# Patient Record
Sex: MALE | Race: WHITE | NOT HISPANIC OR LATINO | Employment: STUDENT | URBAN - METROPOLITAN AREA
[De-identification: names, ages, dates, MRNs, and addresses within clinical notes are randomized per-mention and may not be internally consistent; named-entity substitution may affect disease eponyms.]

---
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
## 2024-10-31 ENCOUNTER — HOSPITAL ENCOUNTER (EMERGENCY)
Facility: HOSPITAL | Age: 24
Discharge: HOME | End: 2024-10-31
Attending: EMERGENCY MEDICINE
Payer: COMMERCIAL

## 2024-10-31 VITALS
RESPIRATION RATE: 20 BRPM | OXYGEN SATURATION: 99 % | SYSTOLIC BLOOD PRESSURE: 135 MMHG | HEART RATE: 96 BPM | DIASTOLIC BLOOD PRESSURE: 78 MMHG | TEMPERATURE: 98 F

## 2024-10-31 DIAGNOSIS — J02.9 ACUTE SORE THROAT: Primary | ICD-10-CM

## 2024-10-31 DIAGNOSIS — R11.2 NAUSEA & VOMITING: ICD-10-CM

## 2024-10-31 LAB — S PYO DNA THROAT QL NAA+PROBE: NOT DETECTED

## 2024-10-31 PROCEDURE — 87651 STREP A DNA AMP PROBE: CPT | Performed by: EMERGENCY MEDICINE

## 2024-10-31 PROCEDURE — 99284 EMERGENCY DEPT VISIT MOD MDM: CPT | Performed by: EMERGENCY MEDICINE

## 2024-10-31 PROCEDURE — 99283 EMERGENCY DEPT VISIT LOW MDM: CPT

## 2024-10-31 RX ORDER — IBUPROFEN 600 MG/1
600 TABLET, FILM COATED ORAL ONCE
Status: COMPLETED | OUTPATIENT
Start: 2024-10-31 | End: 2024-10-31

## 2024-10-31 RX ORDER — ONDANSETRON 4 MG/1
4 TABLET, ORALLY DISINTEGRATING ORAL EVERY 6 HOURS PRN
Qty: 12 TABLET | Refills: 0 | Status: SHIPPED | OUTPATIENT
Start: 2024-10-31 | End: 2024-10-31

## 2024-10-31 RX ORDER — ACETAMINOPHEN 325 MG/1
975 TABLET ORAL ONCE
Status: COMPLETED | OUTPATIENT
Start: 2024-10-31 | End: 2024-10-31

## 2024-10-31 RX ORDER — ONDANSETRON 4 MG/1
4 TABLET, ORALLY DISINTEGRATING ORAL EVERY 6 HOURS PRN
Qty: 12 TABLET | Refills: 0 | Status: SHIPPED | OUTPATIENT
Start: 2024-10-31

## 2024-10-31 RX ADMIN — ACETAMINOPHEN 975 MG: 325 TABLET ORAL at 13:49

## 2024-10-31 RX ADMIN — IBUPROFEN 600 MG: 600 TABLET, FILM COATED ORAL at 13:49

## 2024-10-31 NOTE — Clinical Note
Manuel Gonzales was seen and treated in our emergency department on 10/31/2024.                Diagnosis: Nausea vomiting    Manuel  may return to work on return date.    He may return on this date: 11/01/2024    Symptoms beginning Monday     If you have any questions or concerns, please don't hesitate to call.      Kale Worthy MD    ______________________________           _______________          _______________  Hospital Representative                              Date                                Time

## 2024-10-31 NOTE — ED PROVIDER NOTES
Time reflects when diagnosis was documented in both MDM as applicable and the Disposition within this note       Time User Action Codes Description Comment    10/31/2024  2:08 PM Kale Worthy Add [J02.9] Acute sore throat     10/31/2024  2:08 PM Kale Worthy Add [R11.2] Nausea & vomiting           ED Disposition       ED Disposition   Discharge    Condition   Stable    Date/Time   Thu Oct 31, 2024  2:08 PM    Comment   Manuel Gonzales discharge to home/self care.                   Assessment & Plan       Medical Decision Making  24-year-old male no significant reported prior past history presenting with nausea vomiting sore throat.  Self-limited nausea vomiting abdominal cramping after eating sushi.  Likely food related.  Sore throat began after vomiting likely related to vomiting.  Patient concerned for strep.  Will perform strep test.  Symptom management with oral medications.  Reassess.    Strep test negative.  Abdominal pain nausea vomiting sore throat precautions.  Work note. Discussed results and recommendations. Advised follow up PCP. Medication recommendations. Given instructions and return precautions. Patient/family at bedside acknowledged understanding of all written and verbal instructions and return precautions. Discharged.     Amount and/or Complexity of Data Reviewed  Labs: ordered.    Risk  OTC drugs.  Prescription drug management.             Medications   acetaminophen (TYLENOL) tablet 975 mg (975 mg Oral Given 10/31/24 1349)   ibuprofen (MOTRIN) tablet 600 mg (600 mg Oral Given 10/31/24 1349)       ED Risk Strat Scores                           SBIRT 20yo+      Flowsheet Row Most Recent Value   Initial Alcohol Screen: US AUDIT-C     1. How often do you have a drink containing alcohol? 0 Filed at: 10/31/2024 1226   2. How many drinks containing alcohol do you have on a typical day you are drinking?  0 Filed at: 10/31/2024 1226   3a. Male UNDER 65: How often do you have five or more drinks on one  occasion? 0 Filed at: 10/31/2024 1226   3b. FEMALE Any Age, or MALE 65+: How often do you have 4 or more drinks on one occassion? 0 Filed at: 10/31/2024 1226   Audit-C Score 0 Filed at: 10/31/2024 1226   LEVI: How many times in the past year have you...    Used an illegal drug or used a prescription medication for non-medical reasons? Never Filed at: 10/31/2024 1226                            History of Present Illness       Chief Complaint   Patient presents with    Flu Symptoms     Pt presents with c/o vomiting, cough, sore throat since Monday. Denies fevers. Recent travel to [place]        Past Medical History:   Diagnosis Date    Acne vulgaris       Past Surgical History:   Procedure Laterality Date    ABDOMINAL WALL SURGERY Bilateral 3/5/2019    Procedure: INTERMEDIATE CLOSURE WOUND NECK 2 CM X2;  Surgeon: Christo Swenson MD;  Location: [facility];  Service: General    CIRCUMCISION      FACIAL/NECK BIOPSY Bilateral 3/5/2019    Procedure: POSTERIOR NECK LESION/MASS X 2 EXCISION BIOPSY;  Surgeon: Christo Swenson MD;  Location: [facility];  Service: General    SINUS SURGERY      Closure of sacral sinus at age 3 year.  MRI was normal.  Surger done at [facility]      Family History   Problem Relation Age of Onset    No Known Problems Mother         living and healthy    No Known Problems Father         living and healthy    No Known Problems Paternal Grandmother     Diabetes Paternal Grandfather         DM    Cirrhosis Paternal Grandfather     Learning disabilities Family     No Known Problems Maternal Grandmother     No Known Problems Maternal Grandfather       Social History     Tobacco Use    Smoking status: Never    Smokeless tobacco: Never    Tobacco comments:     no tobacco/smoke exposure   Substance Use Topics    Alcohol use: No     Comment: Denies any use    Drug use: No     Comment: Denies any use      E-Cigarette/Vaping      E-Cigarette/Vaping Substances      I have reviewed and agree with the  history as documented.     24-year-old male no significant reported prior past history presenting with nausea vomiting sore throat.  Patient reports he had some sushi on Sunday and felt ill afterwards.  Patient reports that he began vomiting and had a few episodes of emesis as well as some clamminess and cold sweats associated with the abdominal cramping and vomiting.  Patient reports that his symptoms have resolved as of yesterday except for sore throat.  Denies any abdominal pain or current nausea vomiting.  Denies any chest pain shortness of breath.  Denies any other complaints.  Chart reviewed.    Past Medical History:  No date: Acne vulgaris  Family History: non-contributory  Social History          Review of Systems   Constitutional:  Negative for appetite change, chills, diaphoresis, fever and unexpected weight change.   HENT:  Positive for sore throat. Negative for congestion and rhinorrhea.    Eyes:  Negative for photophobia and visual disturbance.   Respiratory:  Negative for cough, chest tightness and shortness of breath.    Cardiovascular:  Negative for chest pain, palpitations and leg swelling.   Gastrointestinal:  Positive for nausea and vomiting. Negative for abdominal distention, abdominal pain, blood in stool, constipation and diarrhea.   Genitourinary:  Negative for dysuria and hematuria.   Musculoskeletal:  Negative for back pain, joint swelling, neck pain and neck stiffness.   Skin:  Negative for color change, pallor, rash and wound.   Neurological:  Negative for dizziness, syncope, weakness, light-headedness and headaches.   Psychiatric/Behavioral:  Negative for agitation.    All other systems reviewed and are negative.          Objective       ED Triage Vitals   Temperature Pulse Blood Pressure Respirations SpO2 Patient Position - Orthostatic VS   10/31/24 1226 10/31/24 1226 10/31/24 1227 10/31/24 1226 10/31/24 1226 10/31/24 1226   98 °F (36.7 °C) 101 135/78 20 99 % Sitting      Temp Source  Heart Rate Source BP Location FiO2 (%) Pain Score    10/31/24 1226 10/31/24 1226 10/31/24 1226 -- --    Temporal Monitor Left arm        Vitals      Date and Time Temp Pulse SpO2 Resp BP Pain Score FACES Pain Rating User   10/31/24 1355 -- 96 -- -- -- -- -- BK   10/31/24 1227 -- -- -- -- 135/78 -- -- FT   10/31/24 1226 98 °F (36.7 °C) 101 99 % 20 -- -- -- FT            Physical Exam  Vitals and nursing note reviewed.   Constitutional:       General: He is not in acute distress.     Appearance: Normal appearance. He is well-developed. He is not ill-appearing, toxic-appearing or diaphoretic.   HENT:      Head: Normocephalic and atraumatic.      Nose: Nose normal. No congestion or rhinorrhea.      Mouth/Throat:      Mouth: Mucous membranes are moist.      Pharynx: Oropharynx is clear. Posterior oropharyngeal erythema present. No oropharyngeal exudate.   Eyes:      General: No scleral icterus.        Right eye: No discharge.         Left eye: No discharge.      Extraocular Movements: Extraocular movements intact.      Conjunctiva/sclera: Conjunctivae normal.      Pupils: Pupils are equal, round, and reactive to light.   Neck:      Vascular: No JVD.      Trachea: No tracheal deviation.      Comments: Supple. Normal range of motion.   Cardiovascular:      Rate and Rhythm: Normal rate and regular rhythm.      Heart sounds: Normal heart sounds. No murmur heard.     No friction rub. No gallop.      Comments: Normal rate and regular rhythm  Pulmonary:      Effort: Pulmonary effort is normal. No respiratory distress.      Breath sounds: Normal breath sounds. No stridor. No wheezing or rales.      Comments: Clear to auscultation bilaterally  Chest:      Chest wall: No tenderness.   Abdominal:      General: Bowel sounds are normal. There is no distension.      Palpations: Abdomen is soft.      Tenderness: There is no abdominal tenderness. There is no right CVA tenderness, left CVA tenderness, guarding or rebound.      Comments:  Soft, nontender, nondistended.  Normal bowel sounds throughout   Musculoskeletal:         General: No swelling, tenderness, deformity or signs of injury. Normal range of motion.      Cervical back: Normal range of motion and neck supple. No rigidity. No muscular tenderness.      Right lower leg: No edema.      Left lower leg: No edema.   Lymphadenopathy:      Cervical: No cervical adenopathy.   Skin:     General: Skin is warm and dry.      Coloration: Skin is not pale.      Findings: No erythema or rash.   Neurological:      General: No focal deficit present.      Mental Status: He is alert. Mental status is at baseline.      Sensory: No sensory deficit.      Motor: No weakness or abnormal muscle tone.      Coordination: Coordination normal.      Gait: Gait normal.      Comments: Alert.  Strength and sensation grossly intact.  Ambulatory without difficulty at baseline.    Psychiatric:         Behavior: Behavior normal.         Thought Content: Thought content normal.         Results Reviewed       Procedure Component Value Units Date/Time    Strep A PCR [[id number]]  (Normal) Collected: 10/31/24 1352    Lab Status: Final result Specimen: Throat Updated: 10/31/24 1430     STREP A PCR Not Detected            No orders to display       Procedures    ED Medication and Procedure Management   None     Current Discharge Medication List        START taking these medications    Details   ondansetron (ZOFRAN-ODT) 4 mg disintegrating tablet Take 1 tablet (4 mg total) by mouth every 6 (six) hours as needed for nausea or vomiting  Qty: 12 tablet, Refills: 0    Associated Diagnoses: Nausea & vomiting           No discharge procedures on file.  ED SEPSIS DOCUMENTATION   Time reflects when diagnosis was documented in both MDM as applicable and the Disposition within this note       Time User Action Codes Description Comment    10/31/2024  2:08 PM Kale Worthy [J02.9] Acute sore throat     10/31/2024  2:08 PM Kale Worhty Add  [R11.2] Nausea & vomiting                  Kale Worthy MD  10/31/24 6725

## 2024-11-01 ENCOUNTER — TELEPHONE (OUTPATIENT)
Dept: PEDIATRICS CLINIC | Facility: CLINIC | Age: 24
End: 2024-11-01

## 2024-11-01 NOTE — TELEPHONE ENCOUNTER
Patient has aged out of practice and has not been seen in office since 2020. Please remove Dr. Ma from pcp field.

## 2024-11-11 NOTE — TELEPHONE ENCOUNTER
11/11/24 10:36 AM        The office's request has been received, reviewed, and the patient chart updated. The PCP has successfully been removed with a patient attribution note. This message will now be completed.        Thank you  Elvis Manzanares

## (undated) DEVICE — CHLORAPREP HI-LITE 10.5ML ORANGE

## (undated) DEVICE — BETHLEHEM UNIVERSAL MINOR GEN: Brand: CARDINAL HEALTH

## (undated) DEVICE — GLOVE SRG BIOGEL ECLIPSE 7

## (undated) DEVICE — NEEDLE 25G X 1 1/2

## (undated) DEVICE — CHLORAPREP HI-LITE 26ML ORANGE

## (undated) DEVICE — SUT MONOCRYL 4-0 PS-2 18 IN Y496G

## (undated) DEVICE — ADHESIVE SKN CLSR HISTOACRYL FLEX 0.5ML LF

## (undated) DEVICE — REM POLYHESIVE ADULT PATIENT RETURN ELECTRODE: Brand: VALLEYLAB

## (undated) DEVICE — MEDI-VAC YANKAUER SUCTION HANDLE W/STRAIGHT TIP & CONTROL VENT: Brand: CARDINAL HEALTH

## (undated) DEVICE — LIGHT HANDLE COVER SLEEVE DISP BLUE STELLAR

## (undated) DEVICE — PLUMEPEN PRO 10FT

## (undated) DEVICE — INTENDED FOR TISSUE SEPARATION, AND OTHER PROCEDURES THAT REQUIRE A SHARP SURGICAL BLADE TO PUNCTURE OR CUT.: Brand: BARD-PARKER SAFETY BLADES SIZE 15, STERILE

## (undated) DEVICE — TUBING SUCTION 5MM X 12 FT

## (undated) DEVICE — DRAPE EQUIPMENT RF WAND

## (undated) DEVICE — GLOVE INDICATOR PI UNDERGLOVE SZ 7 BLUE